# Patient Record
Sex: MALE | URBAN - METROPOLITAN AREA
[De-identification: names, ages, dates, MRNs, and addresses within clinical notes are randomized per-mention and may not be internally consistent; named-entity substitution may affect disease eponyms.]

---

## 2021-11-05 ENCOUNTER — IMMUNIZATION (OUTPATIENT)
Dept: PRIMARY CARE CLINIC | Facility: CLINIC | Age: 66
End: 2021-11-05

## 2021-11-05 DIAGNOSIS — Z23 NEED FOR VACCINATION: Primary | ICD-10-CM

## 2021-11-05 PROCEDURE — 0004A COVID-19, MRNA, LNP-S, PF, 30 MCG/0.3 ML DOSE VACCINE: CPT | Mod: CV19,PBBFAC | Performed by: INTERNAL MEDICINE

## 2022-05-18 ENCOUNTER — HOSPITAL ENCOUNTER (OUTPATIENT)
Dept: PHYSICAL THERAPY | Age: 67
Discharge: HOME | End: 2022-05-18
Payer: MEDICARE

## 2022-05-18 PROCEDURE — 97110 THERAPEUTIC EXERCISES: CPT

## 2022-05-18 PROCEDURE — 97162 PT EVAL MOD COMPLEX 30 MIN: CPT

## 2022-05-18 PROCEDURE — 97140 MANUAL THERAPY 1/> REGIONS: CPT

## 2022-05-18 NOTE — PROGRESS NOTES
[facility] PHYSICAL THERAPY   [facility], [address],[place], [facility] - Phone: [phone]  Fax: [phone] / [address] [facility]  Patient Name: Kortney Vallejo : 1955   Medical   Diagnosis: Other low back pain [M54.59] Treatment Diagnosis: Other low back pain [M54.59]   Onset Date: Chronic, ~5 yrs ago     Referral Source: Carmel Carmona [place] [facility]): 2022   Prior Hospitalization: See medical history Provider #: [id number]   Prior Level of Function: Indep, pain free   Comorbidities: HTN, hypercholesterolemia   Medications: Verified on Patient Summary List   The Plan of Care and following information is based on the information from the initial evaluation.   ===========================================================================================  Subjective: Reports ~5 yrs ago was building his deck and he noted LBP. He felt it bad and used some crutches, and the pain went away, but he notices it's coming more and more intense and bothers him. A couple times a day every day. Slowly getting worse, no injury really. Reports bilateral lower LBP and bilateral \"buttcheek\" pain and some left toe tingling in the leg intermittently. Current plays golf, but walking over 1 mile bothers the back, he must stop and bend forward. Reports bending over to  the grand kids also bothers it. Reports maybe he lifts the wrong way, he perhaps is back dominant. Denies it affecting sleep. Stretching makes it better. Current retired from inspecting homes. Assessment / key information:  Pt presents to [facility] at [facility]. with signs and symptoms congruent with a diagnosis of Low Back Pain and mild arthritic/stenotic type presentation at the left lower lumbar, with bilateral (L>R) piriformis spasm which affects walking tolerance and lifting grandkids.    Patient would benefit from skilled intervention to address the above deficits, improve quality of life and return patient to maximum level of prior function. OBJECTIVE:   Gait/Posture: Non-antalgic, stiff trunk with reduced rotation, slight hip ER  Trunk AROM  Flexion: finger-tips to ankles, lumbosacral junction pain centrally and HS tightness reported  Extension: 10 deg, Side Bend: WNL bilateral  Rotation: R: 60 deg,  L: 50 deg \"stiff\"    Slump: (-) but positive for sitting lumbar extension  Straight Leg Raise: (-) bilaterally, tightness at ~70 deg  Palpation: TTP to the L>R piriformis, medially along the lateral sacral boarder. Transfer/Squat: WNL    HEP: Access Code: [email or web address]  URL: [email or web address]  Date: 05/18/2022 Prepared by: Danielle Jarrell    Exercises  Prone Hip Extension with Bent Knee - 1 x daily - 7 x weekly - 2 sets - 10 reps  Sidelying Hip Abduction - 1 x daily - 7 x weekly - 1 sets - 10 reps  Supine Lower Trunk Rotation - 2 x daily - 7 x weekly - 1 sets - 10 reps  Seated Thoracic Flexion and Extension - 2 x daily - 7 x weekly - 1 sets - 10 reps  Seated Piriformis Stretch - 1 x daily - 7 x weekly - 1 sets - 3 reps - 20 hold  Seated Piriformis Stretch - 1 x daily - 7 x weekly - 1 sets - 3 reps - 20 hold  ===========================================================================================  Eval Complexity: History MEDIUM  Complexity : 1-2 comorbidities / personal factors will impact the outcome/ POC ;  Examination  MEDIUM Complexity : 3 Standardized tests and measures addressing body structure, function, activity limitation and / or participation in recreation ; Presentation MEDIUM Complexity : Evolving with changing characteristics ;   Decision Making MEDIUM Complexity : FOTO score of 26-74; Overall Complexity MEDIUM  Problem List: pain affecting function, decrease ROM, impaired gait/ balance, decrease ADL/ functional abilitiies, decrease activity tolerance and decrease flexibility/ joint mobility   Treatment Plan may include any combination of the following: Therapeutic exercise, Therapeutic activities, Neuromuscular re-education, Physical agent/modality, Gait/balance training, Manual therapy, Patient education, Self Care training and Functional mobility training  Patient / Family readiness to learn indicated by: asking questions, trying to perform skills and interest  Persons(s) to be included in education: patient (P)  Barriers to Learning/Limitations: None  Measures taken, if barriers to learning:    Patient Goal (s): Walk longer and  grand kids   Patient self reported health status: good  Rehabilitation Potential: good   Short Term Goals: To be accomplished in  2  weeks  STG1 Pt to report adherence and demonstrate compliance with basic HEP to allow progress between visits  STG2 Pt to report pain <2/10 at worst to allow improved function and QOL   Long Term Goals: To be accomplished in  4  weeks  LTG1 Pt to improve FOTO score to 75+/100 indicating improved function in daily tasks  LTG2 Pt to improve lumbar flexion AROM to finger-tips to ankles and extension to 15 deg, without increase in pain to allow improved function in the home  LTG3 Pt to demonstrate 10 minutes of walking in clinic and report 60 minutes in the community without increase in pain to indicate improved community engagement. LTG4 Pt to lift floor to waist x5 30 lbs with good form and without reports of LBP to allow return towards PLOF    Frequency / Duration:   Patient to be seen  2  times per week for 4  weeks  Patient / Caregiver education and instruction: self care and exercises  Therapist Signature: Gina Demarco, PT Date: 1/04/5514   Certification Period: 5/18/22 - 8/17/22 Time: 10:08 AM   ===========================================================================================  I certify that the above Physical Therapy Services are being furnished while the patient is under my care.   I agree with the treatment plan and certify that this therapy is necessary. Physician Signature:        Date:       Time:                                        Noé Michele PA-C  Please sign and return to [facility] AT [facility]. or you may fax the signed copy to [phone]. Thank you.

## 2022-05-18 NOTE — PROGRESS NOTES
PHYSICAL THERAPY - DAILY TREATMENT NOTE    Patient Name: Angie Montemayor        Date: 2022  : 1955   YES Patient  Verified  Visit #:   1   of   8  Insurance: Payor: Purvi Roses / Plan: [place] MEDICARE PART A & B / Product Type: Medicare /      In time: 10:00 Out time: 10:55   Total Treatment Time: 55     Medicare/BCBS Time Tracking (below)   Total Timed Codes (min):  40 1:1 Treatment Time:  55     TREATMENT AREA =  Other low back pain [M54.59]    SUBJECTIVE    Pain Level (on 0 to 10 scale):  0  / 10   Medication Changes/New allergies or changes in medical history, any new surgeries or procedures? NO    If yes, update Summary List   Subjective Functional Status/Changes:  []  No changes reported     See POC          OBJECTIVE  25 min Manual Therapy: Prone: DTM/TrP release to the pirifomis medially with prone hip rotation and UPAs tot the sacrum, L>R. Rationale:      decrease pain, increase ROM, increase tissue extensibility and decrease trigger points to improve patient's ability to walk  The manual therapy interventions were performed at a separate and distinct time from the therapeutic activities interventions. 10 min Therapeutic Exercise:  [x]  See flow sheet   Rationale:      increase ROM and increase strength to improve the patients ability to make gains btw sessions     5 NB min Self Care: Reviewed diagnosis, prognosis, therapy progression   Rationale:    Improve understanding of injury and therapy to have realistic expectation of therapy to improve compliance/adherence and satisfaction    Billed With/As:   [] TE   [] TA   [] Neuro   [x] Self Care Patient Education: [x] Review HEP    [] Progressed/Changed HEP based on:   [x] Addressed barriers and behaviors     [] Therapeutic Neuroscience Pain Education, metaphor, reframing, contexts.     [x] Sleep Education   [] Body Mechanics [x] Healing Timeframe     [] Self STM with ball at \"the spot\"  [x] Walking Program/Global Activity   [] other: Other Objective/Functional Measures:    See POC note     Post Treatment Pain Level (on 0 to 10) scale:   0 / 10     ASSESSMENT  Assessment/Changes in Function:     See POC     []  See Progress Note/Recertification   Patient will continue to benefit from skilled PT services to modify and progress therapeutic interventions, address functional mobility deficits, address ROM deficits, address strength deficits, analyze and address soft tissue restrictions, analyze and cue movement patterns, analyze and modify body mechanics/ergonomics and assess and modify postural abnormalities to attain goals per POC. Progress toward goals / Updated goals:    Pt evaluated today.  See POC     PLAN  [x]  Upgrade activities as tolerated YES Continue plan of care   []  Discharge due to :    []  Other:      Therapist: Bee Delgado PT, DPT, OCS    Date: 5/18/2022 Time: 11:54 AM

## 2022-05-23 ENCOUNTER — HOSPITAL ENCOUNTER (OUTPATIENT)
Dept: PHYSICAL THERAPY | Age: 67
Discharge: HOME | End: 2022-05-23
Payer: MEDICARE

## 2022-05-23 PROCEDURE — 97140 MANUAL THERAPY 1/> REGIONS: CPT

## 2022-05-23 PROCEDURE — 97110 THERAPEUTIC EXERCISES: CPT

## 2022-05-23 NOTE — PROGRESS NOTES
PHYSICAL THERAPY - DAILY TREATMENT NOTE    Patient Name: Gene Pretty        Date: 2022  : 1955   YES Patient  Verified  Visit #:   2   of   8  Insurance: Payor: Ruth Olivas / Plan: [place] MEDICARE PART A & B / Product Type: Medicare /      In time: 9561 Out time: 1140   Total Treatment Time: 55     Medicare/BCBS Time Tracking (below)   Total Timed Codes (min):  55 1:1 Treatment Time:  50     TREATMENT AREA =  Other low back pain [M54.59]    SUBJECTIVE    Pain Level (on 0 to 10 scale):  0  / 10   Medication Changes/New allergies or changes in medical history, any new surgeries or procedures? NO    If yes, update Summary List   Subjective Functional Status/Changes:  []  No changes reported   Pt reports decreased pain with stretches which were provided on eval. Currently, he has no pain. He walks 5 miles a day. OBJECTIVE  15 min Manual Therapy: Prone: DTM/TrP release to the pirifomis medially with prone hip rotation and UPAs tot the sacrum, L>R   Rationale:      decrease pain, increase ROM, increase tissue extensibility and decrease trigger points to improve patient's ability to walk  The manual therapy interventions were performed at a separate and distinct time from the therapeutic activities interventions. 40/35 (1:1) min Therapeutic Exercise:  [x]  See flow sheet   Rationale:      increase ROM and increase strength to improve the patients ability to make gains btw sessions     Billed With/As:   [] TE   [] TA   [] Neuro   [x] Self Care Patient Education: [x] Review HEP    [] Progressed/Changed HEP based on:   [x] Addressed barriers and behaviors     [] Therapeutic Neuroscience Pain Education, metaphor, reframing, contexts.     [x] Sleep Education   [] Body Mechanics [x] Healing Timeframe     [] Self STM with ball at \"the spot\"  [x] Walking Program/Global Activity   [] other:         Other Objective/Functional Measures:  initiated therex as per flow sheet   Post Treatment Pain Level (on 0 to 10) scale:   0 / 10     ASSESSMENT  Assessment/Changes in Function:   Pt tolerated first f/u well. + 90-90 and L-S stiffness at start of session which decreased with manual therapy and stretching. []  See Progress Note/Recertification   Patient will continue to benefit from skilled PT services to modify and progress therapeutic interventions, address functional mobility deficits, address ROM deficits, address strength deficits, analyze and address soft tissue restrictions, analyze and cue movement patterns, analyze and modify body mechanics/ergonomics and assess and modify postural abnormalities to attain goals per POC.    Progress toward goals / Updated goals:  First f/u since eval     PLAN  [x]  Upgrade activities as tolerated YES Continue plan of care   []  Discharge due to :    []  Other:      Therapist: Nahomi Denney PT, DPT      Date: 5/23/2022 Time: 11:54 AM

## 2022-05-25 ENCOUNTER — HOSPITAL ENCOUNTER (OUTPATIENT)
Dept: PHYSICAL THERAPY | Age: 67
Discharge: HOME | End: 2022-05-25
Payer: MEDICARE

## 2022-05-25 PROCEDURE — 97530 THERAPEUTIC ACTIVITIES: CPT

## 2022-05-25 PROCEDURE — 97110 THERAPEUTIC EXERCISES: CPT

## 2022-05-25 NOTE — PROGRESS NOTES
PHYSICAL THERAPY - DAILY TREATMENT NOTE    Patient Name: Esther Allen        Date: 2022  : 1955   YES Patient  Verified  Visit #:   3   of   8  Insurance: Payor: Jluis Gauthier / Plan: [place] MEDICARE PART A & B / Product Type: Medicare /      In time: 1:35 Out time: 2:25   Total Treatment Time: 50     Medicare/BCBS Time Tracking (below)   Total Timed Codes (min):  50 1:1 Treatment Time:  50     TREATMENT AREA =  Other low back pain [M54.59]    SUBJECTIVE  Pain Level (on 0 to 10 scale):  0  / 10   Medication Changes/New allergies or changes in medical history, any new surgeries or procedures? NO If yes, update Summary List   Subjective Functional Status/Changes:  []  No changes reported     Says feeling really good with the PT. Says walked 1 mile, did some stretches and was able to keep walking. OBJECTIVE    35 min Therapeutic Exercise:  [x]  See flow sheet   Rationale:      increase ROM and increase strength to improve the patients ability to strengthen mm for walking     15 min Therapeutic Activity: [x]  See flow sheet   Rationale:    increase strength and improve coordination to improve the patients ability to control proprioception, squatting. Billed With/As:   [] TE   [] TA   [] Neuro   [] Self Care Patient Education: [x] Review HEP    [] Progressed/Changed HEP based on:   [] Addressed barriers and behaviors     [] Therapeutic Neuroscience Pain Education, metaphor, reframing, contexts. [] Sleep Education   [] Body Mechanics [] Healing Timeframe     [] Self STM with ball at \"the spot\"  [] Walking Program/Global Activity   [] other:      Other Objective/Functional Measures:    See Flowsheet for drills, loads and volume. HEP: Access Code: [email or web address]  URL: [email or web address]  Date: 2022 Prepared by: Karlos Cabello    Exercises  Prone Hip Extension with Bent Knee - 1 x daily - 7 x weekly - 2 sets - 10 reps  Sidelying Hip Abduction - 1 x daily - 7 x weekly - 1 sets - 10 reps  Supine Lower Trunk Rotation - 2 x daily - 7 x weekly - 1 sets - 10 reps  Seated Thoracic Flexion and Extension - 2 x daily - 7 x weekly - 1 sets - 10 reps  Seated Piriformis Stretch - 1 x daily - 7 x weekly - 1 sets - 3 reps - 20 hold  Seated Piriformis Stretch - 1 x daily - 7 x weekly - 1 sets - 3 reps - 20 hold  Supine Bridge - 1 x daily - 7 x weekly - 2 sets - 10 reps  Hooklying Active Hamstring Stretch - 1 x daily - 7 x weekly - 2 sets - 10 reps  Single Knee to Chest - 1 x daily - 7 x weekly - 1 sets - 10 reps  Walking Butt Kicks - 1 x daily - 7 x weekly - 1 sets - 10 reps  Norm Kicks - 1 x daily - 7 x weekly - 1 sets - 10 reps  Runner's Lunge - 1 x daily - 7 x weekly - 1 sets - 10 reps  Squatting Anti-Rotation Press - 1 x daily - 7 x weekly - 2 sets - 10 reps       Post Treatment Pain Level (on 0 to 10) scale:   0  / 10     ASSESSMENT  Assessment/Changes in Function:     Pt requires skilled instruction for initiation, form and follow-through for all drills in session. Pt responds well to instruction and demo. Progressed drills in session and expanded HEP. []  See Progress Note/Recertification   Patient will continue to benefit from skilled PT services to modify and progress therapeutic interventions, address functional mobility deficits, address ROM deficits, address strength deficits, analyze and address soft tissue restrictions, analyze and cue movement patterns, analyze and modify body mechanics/ergonomics and assess and modify postural abnormalities to attain goals per POC. Progress toward goals / Updated goals: · Short Term Goals: To be accomplished in  2  weeks  STG1 Pt to report adherence and demonstrate compliance with basic HEP to allow progress between visits MET to date  STG2 Pt to report pain <2/10 at worst to allow improved function and QOL  · Long Term Goals:  To be accomplished in  4  weeks  LTG1 Pt to improve FOTO score to 75+/100 indicating improved function in daily tasks  LTG2 Pt to improve lumbar flexion AROM to finger-tips to ankles and extension to 15 deg, without increase in pain to allow improved function in the home  LTG3 Pt to demonstrate 10 minutes of walking in clinic and report 60 minutes in the community without increase in pain to indicate improved community engagement.   LTG4 Pt to lift floor to waist x5 30 lbs with good form and without reports of LBP to allow return towards PLOF       PLAN  [x]  Upgrade activities as tolerated YES Continue plan of care   []  Discharge due to :    []  Other:      Therapist: Francisca Linares, PT    Date: 5/25/2022 Time: 1:19 PM     Future Appointments   Date Time Provider Arlene Valencia   5/25/2022  1:30 PM Zamzam Molina, PT [facility]   5/31/2022 11:15 AM Paxton Zimmer, PT [facility]   6/3/2022  1:15 PM [address] [facility]   6/6/2022  2:30 PM Zamzam Molina, PT [facility]   6/8/2022  2:30 PM Zamzam Molina, PT [address]   6/20/2022  1:45 PM Zamzam Molina, PT [facility]   6/22/2022 12:45 PM Santosh Silverio, PT [facility]

## 2022-05-31 ENCOUNTER — HOSPITAL ENCOUNTER (OUTPATIENT)
Dept: PHYSICAL THERAPY | Age: 67
Discharge: HOME | End: 2022-05-31
Payer: MEDICARE

## 2022-05-31 PROCEDURE — 97110 THERAPEUTIC EXERCISES: CPT

## 2022-05-31 PROCEDURE — 97530 THERAPEUTIC ACTIVITIES: CPT

## 2022-05-31 NOTE — PROGRESS NOTES
PHYSICAL THERAPY - DAILY TREATMENT NOTE    Patient Name: Bert Tang        Date: 2022  : 1955   YES Patient  Verified  Visit #:   4   of   8  Insurance: Payor: Magy Chandler / Plan: [place] MEDICARE PART A & B / Product Type: Medicare /      In time: 11:16 Out time: 11:54   Total Treatment Time: 38     Medicare/BCBS Time Tracking (below)   Total Timed Codes (min):  38 1:1 Treatment Time:  38     TREATMENT AREA =  Other low back pain [M54.59]    SUBJECTIVE  Pain Level (on 0 to 10 scale):  0  / 10   Medication Changes/New allergies or changes in medical history, any new surgeries or procedures? NO If yes, update Summary List   Subjective Functional Status/Changes:  []  No changes reported     Notes if he stretches and does his HEP, he can walk as long as he wants. Walked up an incline this past week a little over 3 miles and had no issues. OBJECTIVE  15 min Therapeutic Exercise:  [x]  See flow sheet   Rationale:      increase ROM and increase strength to improve the patients ability to strengthen mm for walking     23 min Therapeutic Activity: [x]  See flow sheet   Rationale:    increase strength and improve coordination to improve the patients ability to control proprioception, squatting. Billed With/As:   [] TE   [] TA   [] Neuro   [] Self Care Patient Education: [x] Review HEP    [] Progressed/Changed HEP based on:   [] Addressed barriers and behaviors     [] Therapeutic Neuroscience Pain Education, metaphor, reframing, contexts. [] Sleep Education   [] Body Mechanics [] Healing Timeframe     [] Self STM with ball at \"the spot\"  [] Walking Program/Global Activity   [] other:      Other Objective/Functional Measures:    See Flowsheet for drills, loads and volume. Post Treatment Pain Level (on 0 to 10) scale:   0  / 10     ASSESSMENT  Assessment/Changes in Function:     Conversation about POC due to pts subjective.  Pt would like to be put on hold until after he returns from [place] [facility] to manage symptoms on his own. PT is agreeable to this. Reviewed HEP and instructed in form this date. Will assess carry over NV.     []  See Progress Note/Recertification   Patient will continue to benefit from skilled PT services to modify and progress therapeutic interventions, address functional mobility deficits, address ROM deficits, address strength deficits, analyze and address soft tissue restrictions, analyze and cue movement patterns, analyze and modify body mechanics/ergonomics and assess and modify postural abnormalities to attain goals per POC. Progress toward goals / Updated goals: · Short Term Goals: To be accomplished in  2  weeks  STG1 Pt to report adherence and demonstrate compliance with basic HEP to allow progress between visits MET to date  STG2 Pt to report pain <2/10 at worst to allow improved function and QOL  · Long Term Goals: To be accomplished in  4  weeks  LTG1 Pt to improve FOTO score to 75+/100 indicating improved function in daily tasks  LTG2 Pt to improve lumbar flexion AROM to finger-tips to ankles and extension to 15 deg, without increase in pain to allow improved function in the home  LTG3 Pt to demonstrate 10 minutes of walking in clinic and report 60 minutes in the community without increase in pain to indicate improved community engagement.   LTG4 Pt to lift floor to waist x5 30 lbs with good form and without reports of LBP to allow return towards PLOF       PLAN  [x]  Upgrade activities as tolerated YES Continue plan of care   []  Discharge due to :    []  Other:      Therapist: Madelaine Palma PT    Date: 5/31/2022 Time: 1:19 PM     Future Appointments   Date Time Provider Arlene Valencia   5/31/2022 11:15 AM Maxi Nuno, PT [facility]   6/3/2022  1:15 PM [address] [facility]   6/6/2022  2:30 PM Tracee Hyatt, PT [facility]   6/8/2022  2:30 PM Tracee Hyatt, PT [address]   6/20/2022  1:45 PM Tracee Hyatt PT [facility]   6/22/2022 12:45 PM Brennan Silverio, PT [address]

## 2022-06-03 ENCOUNTER — APPOINTMENT (OUTPATIENT)
Dept: PHYSICAL THERAPY | Age: 67
End: 2022-06-03
Payer: MEDICARE

## 2022-06-06 ENCOUNTER — APPOINTMENT (OUTPATIENT)
Dept: PHYSICAL THERAPY | Age: 67
End: 2022-06-06
Payer: MEDICARE

## 2022-06-08 ENCOUNTER — APPOINTMENT (OUTPATIENT)
Dept: PHYSICAL THERAPY | Age: 67
End: 2022-06-08
Payer: MEDICARE

## 2022-06-20 ENCOUNTER — APPOINTMENT (OUTPATIENT)
Dept: PHYSICAL THERAPY | Age: 67
End: 2022-06-20
Payer: MEDICARE

## 2022-06-22 ENCOUNTER — HOSPITAL ENCOUNTER (OUTPATIENT)
Dept: PHYSICAL THERAPY | Age: 67
Discharge: HOME | End: 2022-06-22
Payer: MEDICARE

## 2022-06-22 PROCEDURE — 97530 THERAPEUTIC ACTIVITIES: CPT

## 2022-06-22 PROCEDURE — 97535 SELF CARE MNGMENT TRAINING: CPT

## 2022-06-22 NOTE — PROGRESS NOTES
[address],[facility], [address] [facility] - Phone: [phone]  Fax: [phone] SUMMARY FOR PHYSICAL THERAPY          Patient Name: Chris Dent : 1955   Treatment/Medical Diagnosis: Other low back pain [M54.59]   Onset Date: Chronic 5 yrs    Referral Source: Hever Palomo Start of [facility]): 22   Prior Hospitalization: See Medical History Provider #: [id number]   Prior Level of Function: Indep, pain free   Comorbidities: HTN, high cholesterol   Medications: Verified on Patient Summary List   Visits from [facility]: 5 Missed Visits: -     GOALS:  · Short Term Goals: To be accomplished in  2  weeks  STG1 Pt to report adherence and demonstrate compliance with basic HEP to allow progress between visits MET to date  STG2 Pt to report pain <2/10 at worst to allow improved function and QOL MET  · Long Term Goals: To be accomplished in  4  weeks  LTG1 Pt to improve FOTO score to 75+/100 indicating improved function in daily tasks MET 94/100  LTG2 Pt to improve lumbar flexion AROM to finger-tips to ankles and extension to 15 deg, without increase in pain to allow improved function in the home MET  LTG3 Pt to demonstrate 10 minutes of walking in clinic and report 60 minutes in the community without increase in pain to indicate improved community engagement. MET  LTG4 Pt to lift floor to waist x5 30 lbs with good form and without reports of LBP to allow return towards PLOF MET    Key Functional Changes/Progress: DC today due to goals met and pt at [facility]  Based on patient progress and goal status patient is appropriate to DC from this episode of care at this time. DC planning and continuation of HEP was discussed, pt instructed, all questions answered. Assessments/Recommendations: Discontinue therapy. Progressing towards or have reached established goals.     If you have any questions/comments please contact us directly at [phone]. Thank you for allowing us to assist in the care of your patient. Therapist Signature: Robyn Hernandez PT Date: 6/22/22   Reporting Period: 5/18/22 - 6/22/22 Time: 1:14 PM   NOTE TO PHYSICIAN:  Your patient's insurance requires this discharge note be signed and returned. PLEASE COMPLETE THE ORDERS BELOW AND RETURN TO:  [facility] PHYSICAL THERAPY     ___ I have read the above report and request that my patient be discharged from therapy.       Physician Signature:                                                               Date:                                        Time:                        Esthela Bello

## 2022-06-22 NOTE — PROGRESS NOTES
PHYSICAL THERAPY - DAILY TREATMENT NOTE    Patient Name: Chris Dent        Date: 2022  : 1955   YES Patient  Verified  Visit #:   5   of   8  Insurance: Payor: Benedict Valdez / Plan: [place] & B / Product Type: Medicare /      In time: 12:45 Out time: 1:10   Total Treatment Time: 25     Medicare/BCBS Time Tracking (below)   Total Timed Codes (min):  25 1:1 Treatment Time:  25     TREATMENT AREA =  Other low back pain [M54.59]    SUBJECTIVE  Pain Level (on 0 to 10 scale):  0  / 10   Medication Changes/New allergies or changes in medical history, any new surgeries or procedures? NO If yes, update Summary List   Subjective Functional Status/Changes:  []  No changes reported     Reports he has been on vacation and felt good and did the HEP and his back is not hurting and this is the best he's felt in a while. OBJECTIVE  10 min Self Care: Activity modification, Pt Ed, progression, DC planning   Rationale:    improve coordination to improve the patients ability to make gains post DC    15 min Therapeutic Activity: [x]  See flow sheet: Deadlift training for function: \"outfielder\" positioning and keeping the weight close   Rationale:    increase strength and improve coordination to improve the patients ability to control proprioception, squatting. Billed With/As:   [] TE   [] TA   [] Neuro   [] Self Care Patient Education: [x] Review HEP    [] Progressed/Changed HEP based on:   [] Addressed barriers and behaviors     [] Therapeutic Neuroscience Pain Education, metaphor, reframing, contexts. [] Sleep Education   [] Body Mechanics [] Healing Timeframe     [] Self STM with ball at \"the spot\"  [] Walking Program/Global Activity   [] other:      Other Objective/Functional Measures:    See Flowsheet for drills, loads and volume.      Post Treatment Pain Level (on 0 to 10) scale:   0  / 10     ASSESSMENT  Assessment/Changes in Function:     DC today due to goals met and pt at [facility] []  See Progress Note/Recertification   Patient will continue to benefit from skilled PT services to modify and progress therapeutic interventions, address functional mobility deficits, address ROM deficits, address strength deficits, analyze and address soft tissue restrictions, analyze and cue movement patterns, analyze and modify body mechanics/ergonomics and assess and modify postural abnormalities to attain goals per POC. Progress toward goals / Updated goals: · Short Term Goals: To be accomplished in  2  weeks  STG1 Pt to report adherence and demonstrate compliance with basic HEP to allow progress between visits MET to date  STG2 Pt to report pain <2/10 at worst to allow improved function and QOL MET  · Long Term Goals: To be accomplished in  4  weeks  LTG1 Pt to improve FOTO score to 75+/100 indicating improved function in daily tasks MET 94/100  LTG2 Pt to improve lumbar flexion AROM to finger-tips to ankles and extension to 15 deg, without increase in pain to allow improved function in the home MET  LTG3 Pt to demonstrate 10 minutes of walking in clinic and report 60 minutes in the community without increase in pain to indicate improved community engagement. MET  LTG4 Pt to lift floor to waist x5 30 lbs with good form and without reports of LBP to allow return towards PLOF MET       PLAN  []  Upgrade activities as tolerated YES Continue plan of care   [x]  Discharge due to : All goals met   []  Other:      Therapist: Cr Niño PT    Date: 6/22/2022 Time: 1:19 PM     No future appointments.